# Patient Record
(demographics unavailable — no encounter records)

---

## 2024-11-06 NOTE — HISTORY OF PRESENT ILLNESS
[Mother] : mother [No] : Patient does not go to dentist yearly [Needs Immunizations] : Needs immunizations [Eats meals with family] : eats meals with family [Grade: ____] : Grade: [unfilled] [Normal Performance] : normal performance [Normal Behavior/Attention] : normal behavior/attention [Normal Homework] : normal homework [Eats regular meals including adequate fruits and vegetables] : eats regular meals including adequate fruits and vegetables [Has friends] : has friends [FreeTextEntry7] : 15 y/o M here for APE. [de-identified] : s/p braces [de-identified] : HPV#1